# Patient Record
Sex: FEMALE | URBAN - METROPOLITAN AREA
[De-identification: names, ages, dates, MRNs, and addresses within clinical notes are randomized per-mention and may not be internally consistent; named-entity substitution may affect disease eponyms.]

---

## 2024-10-01 ENCOUNTER — APPOINTMENT (OUTPATIENT)
Dept: URBAN - METROPOLITAN AREA CLINIC 248 | Age: 59
Setting detail: DERMATOLOGY
End: 2024-10-01

## 2024-10-01 DIAGNOSIS — Z41.9 ENCOUNTER FOR PROCEDURE FOR PURPOSES OTHER THAN REMEDYING HEALTH STATE, UNSPECIFIED: ICD-10-CM

## 2024-10-01 PROCEDURE — OTHER SIGNATURE HYDRAFACIAL: OTHER

## 2024-10-01 NOTE — PROCEDURE: SIGNATURE HYDRAFACIAL
Procedure: Boost
Solution: Beta-HD
Vacuum Pressure Low Setting (Will Not Render If Set To 0): 0
Tip: Hydropeel Tip, Blue
Comments: Pts daughter prepaid at event
Procedure: Peel
Indication: skin texture
Solution Override
Tip: Hydropeel Tip, Clear
Procedure: Exfoliation
Procedure: Extend and Protect
Location: face
Solution: GlySal 15%
Procedure: Extraction
Tip: Hydropeel Tip, Teal
Solution Override: tri-hex
Solution: Activ-4
Tip Override
Solution: Antiox-6
Consent: Written consent obtained, risks reviewed including but not limited to crusting, scabbing, blistering, scarring, darker or lighter pigmentary change, bruising, and/or incomplete response.
Post-Care Instructions: I reviewed with the patient in detail post-care instructions. Patient should stay away from the sun and wear sun protection until treated areas are fully healed.
Treatment Number: 1